# Patient Record
Sex: FEMALE | Race: WHITE | URBAN - METROPOLITAN AREA
[De-identification: names, ages, dates, MRNs, and addresses within clinical notes are randomized per-mention and may not be internally consistent; named-entity substitution may affect disease eponyms.]

---

## 2019-09-03 ENCOUNTER — NEW PATIENT (OUTPATIENT)
Dept: URBAN - METROPOLITAN AREA CLINIC 56 | Facility: CLINIC | Age: 54
End: 2019-09-03
Payer: COMMERCIAL

## 2019-09-03 DIAGNOSIS — H18.832 RECURRENT CORNEAL EROSION OF CORNEA, LEFT EYE: ICD-10-CM

## 2019-09-03 DIAGNOSIS — H52.13 MYOPIA, BILATERAL: ICD-10-CM

## 2019-09-03 DIAGNOSIS — H40.013 OPEN ANGLE WITH BORDERLINE FINDINGS, LOW RISK, BILATERAL: Primary | ICD-10-CM

## 2019-09-03 DIAGNOSIS — H04.123 TEAR FILM INSUFFICIENCY OF BILATERAL LACRIMAL GLANDS: ICD-10-CM

## 2019-09-03 DIAGNOSIS — H52.4 PRESBYOPIA: ICD-10-CM

## 2019-09-03 PROCEDURE — 92004 COMPRE OPH EXAM NEW PT 1/>: CPT | Performed by: OPTOMETRIST

## 2019-09-03 PROCEDURE — 76514 ECHO EXAM OF EYE THICKNESS: CPT | Performed by: OPTOMETRIST

## 2019-09-03 PROCEDURE — 92133 CPTRZD OPH DX IMG PST SGM ON: CPT | Performed by: OPTOMETRIST

## 2019-09-03 RX ORDER — NEOMYCIN SULFATE, POLYMYXIN B SULFATE AND DEXAMETHASONE 3.5; 10000; 1 MG/ML; [USP'U]/ML; MG/ML
SUSPENSION OPHTHALMIC
Qty: 1 | Refills: 2 | Status: INACTIVE
Start: 2019-09-03 | End: 2019-09-24

## 2019-09-03 ASSESSMENT — INTRAOCULAR PRESSURE
OS: 24
OD: 24

## 2019-09-03 ASSESSMENT — VISUAL ACUITY
OS: 20/20
OD: 20/20

## 2019-09-03 ASSESSMENT — KERATOMETRY
OD: 43.13
OS: 43.42

## 2019-09-24 ENCOUNTER — FOLLOW UP ESTABLISHED (OUTPATIENT)
Dept: URBAN - METROPOLITAN AREA CLINIC 56 | Facility: CLINIC | Age: 54
End: 2019-09-24
Payer: COMMERCIAL

## 2019-09-24 DIAGNOSIS — H40.053 OCULAR HYPERTENSION, BILATERAL: ICD-10-CM

## 2019-09-24 PROCEDURE — 92134 CPTRZ OPH DX IMG PST SGM RTA: CPT | Performed by: OPTOMETRIST

## 2019-09-24 PROCEDURE — 92012 INTRM OPH EXAM EST PATIENT: CPT | Performed by: OPTOMETRIST

## 2019-09-24 RX ORDER — CARBOXYMETHYLCELLULOSE SODIUM, GLYCERIN AND POLYSORBATE 80 5; 10; 5 MG/ML; MG/ML; MG/ML
SOLUTION/ DROPS OPHTHALMIC
Qty: 2 | Refills: 0 | Status: INACTIVE
Start: 2019-09-24 | End: 2019-09-25

## 2019-09-24 RX ORDER — NAPHAZOLINE HCL/GLYCERIN 0.012-0.2%
DROPS OPHTHALMIC (EYE)
Qty: 180 | Refills: 3 | Status: INACTIVE
Start: 2019-09-24 | End: 2019-09-25

## 2019-09-24 RX ORDER — CARBOXYMETHYLCELLULOSE SODIUM 10 MG/ML
1 % GEL OPHTHALMIC
Qty: 1 | Refills: 3 | Status: INACTIVE
Start: 2019-09-24 | End: 2019-09-25

## 2019-09-24 ASSESSMENT — INTRAOCULAR PRESSURE
OS: 24
OD: 24

## 2019-09-24 ASSESSMENT — VISUAL ACUITY
OD: 20/20
OS: 20/20

## 2019-10-08 ENCOUNTER — FOLLOW UP ESTABLISHED (OUTPATIENT)
Dept: URBAN - METROPOLITAN AREA CLINIC 56 | Facility: CLINIC | Age: 54
End: 2019-10-08
Payer: COMMERCIAL

## 2019-10-08 DIAGNOSIS — H52.03 HYPERMETROPIA, BILATERAL: ICD-10-CM

## 2019-10-08 PROCEDURE — 92012 INTRM OPH EXAM EST PATIENT: CPT | Performed by: OPTOMETRIST

## 2019-10-08 ASSESSMENT — VISUAL ACUITY
OD: 20/20
OS: 20/20

## 2019-10-08 ASSESSMENT — INTRAOCULAR PRESSURE
OD: 22
OS: 20

## 2019-12-26 ENCOUNTER — FOLLOW UP ESTABLISHED (OUTPATIENT)
Dept: URBAN - METROPOLITAN AREA CLINIC 56 | Facility: CLINIC | Age: 54
End: 2019-12-26
Payer: COMMERCIAL

## 2019-12-26 PROCEDURE — 92012 INTRM OPH EXAM EST PATIENT: CPT | Performed by: OPTOMETRIST

## 2019-12-26 RX ORDER — LOTEPREDNOL ETABONATE 5 MG/ML
0.5 % SUSPENSION/ DROPS OPHTHALMIC
Qty: 1 | Refills: 0 | Status: INACTIVE
Start: 2019-12-26 | End: 2020-07-24

## 2019-12-26 RX ORDER — CYCLOSPORINE 0.5 MG/ML
0.05 % EMULSION OPHTHALMIC
Qty: 2 | Refills: 1 | Status: INACTIVE
Start: 2019-12-26 | End: 2020-01-10

## 2019-12-26 ASSESSMENT — INTRAOCULAR PRESSURE
OD: 21
OS: 20

## 2020-03-10 ENCOUNTER — FOLLOW UP ESTABLISHED (OUTPATIENT)
Dept: URBAN - METROPOLITAN AREA CLINIC 56 | Facility: CLINIC | Age: 55
End: 2020-03-10
Payer: COMMERCIAL

## 2020-03-10 PROCEDURE — 92012 INTRM OPH EXAM EST PATIENT: CPT | Performed by: OPTOMETRIST

## 2020-03-10 RX ORDER — NEOMYCIN SULFATE, POLYMYXIN B SULFATE AND DEXAMETHASONE 3.5; 10000; 1 MG/ML; [USP'U]/ML; MG/ML
SUSPENSION OPHTHALMIC
Qty: 1 | Refills: 1 | Status: INACTIVE
Start: 2020-03-10 | End: 2020-07-24

## 2020-03-10 ASSESSMENT — INTRAOCULAR PRESSURE
OD: 20
OS: 20

## 2020-07-24 ENCOUNTER — FOLLOW UP ESTABLISHED (OUTPATIENT)
Dept: URBAN - METROPOLITAN AREA CLINIC 51 | Facility: CLINIC | Age: 55
End: 2020-07-24
Payer: COMMERCIAL

## 2020-07-24 PROCEDURE — 0330T TEAR FILM IMG UNI/BI W/I&R: CPT | Performed by: OPTOMETRIST

## 2020-11-24 ENCOUNTER — FOLLOW UP ESTABLISHED (OUTPATIENT)
Dept: URBAN - METROPOLITAN AREA CLINIC 56 | Facility: CLINIC | Age: 55
End: 2020-11-24
Payer: COMMERCIAL

## 2020-11-24 PROCEDURE — 68761 CLOSE TEAR DUCT OPENING: CPT | Performed by: OPTOMETRIST

## 2020-11-24 PROCEDURE — 92015 DETERMINE REFRACTIVE STATE: CPT | Performed by: OPTOMETRIST

## 2020-11-24 PROCEDURE — 92012 INTRM OPH EXAM EST PATIENT: CPT | Performed by: OPTOMETRIST

## 2020-11-24 ASSESSMENT — INTRAOCULAR PRESSURE
OD: 15
OS: 15

## 2020-11-24 ASSESSMENT — VISUAL ACUITY
OS: 20/20
OD: 20/20

## 2021-08-10 ENCOUNTER — OFFICE VISIT (OUTPATIENT)
Dept: URBAN - METROPOLITAN AREA CLINIC 51 | Facility: CLINIC | Age: 56
End: 2021-08-10
Payer: COMMERCIAL

## 2021-08-10 PROCEDURE — 99214 OFFICE O/P EST MOD 30 MIN: CPT | Performed by: OPTOMETRIST

## 2021-08-10 RX ORDER — PREDNISOLONE ACETATE 10 MG/ML
1 % SUSPENSION/ DROPS OPHTHALMIC
Qty: 15 | Refills: 1 | Status: INACTIVE
Start: 2021-08-10 | End: 2021-09-08

## 2021-08-10 ASSESSMENT — INTRAOCULAR PRESSURE
OS: 14
OD: 14

## 2021-08-10 NOTE — IMPRESSION/PLAN
Impression: Keratoconjunctivitis sicca, bilateral recalcitrant and has experienced little subjective improvement with persistent conjunctival and corneal staining after a regime of frequent ocular lubricants, lid hygiene, **Intolerable to Xiidra (D/C) due to dysgeusia and no improvement in symptoms and Restasis (1 year ago) caused burning OU. Patient stopped Restasis since [place]. *Uses Refresh Omega 3s, most preferred drops *has not done warm compresses, tried eye heat pads and it burned Plan: START Pred Forte QID OU to help reduce inflammation. Cont with Refresh Dolphin 3 FAs 1gtt Q1-q2h OU. Focus on dry warm compresses ~ 10 minutes. Pt should also get a thryoid panel:
 Pt needs following labs: Anti-thryoglobulin (TSI) Anti-thyroid peroixidase Anti-TSH receptor T3 and T4 levels TSH levels Pt will follow up regarding hormone panel. She has hot flashes, sweats easily. Has to urinate often nightly. Pt needs the following work up/labs to rule out any collagen vascular disorder/autoimmune disease: CBC Erythrocyte sedimentation rate (ESR) HLA-B27
ACE level, Antinuclear antibody (BLAYNE) Rapid plasma reagin (RPR) Venereal disease research laboratory ([facility]) FTA-ABS or MHA-TP Purified protein derivative (PPD) and anergy panel Chest radiograph to rule out sarcoidosis and tuberculosis Follow up in 1 month.

## 2021-09-27 ENCOUNTER — OFFICE VISIT (OUTPATIENT)
Dept: URBAN - METROPOLITAN AREA CLINIC 51 | Facility: CLINIC | Age: 56
End: 2021-09-27
Payer: COMMERCIAL

## 2021-09-27 DIAGNOSIS — H16.223 KERATOCONJUNCTIVITIS SICCA, NOT SPECIFIED AS SJOGREN'S, BILATERAL: Primary | ICD-10-CM

## 2021-09-27 PROCEDURE — 99212 OFFICE O/P EST SF 10 MIN: CPT | Performed by: OPTOMETRIST

## 2021-09-27 RX ORDER — AZITHROMYCIN MONOHYDRATE 250 MG/1
250 MG TABLET, FILM COATED ORAL
Qty: 5 | Refills: 1 | Status: INACTIVE
Start: 2021-09-27 | End: 2021-10-01

## 2021-09-27 RX ORDER — DOXYCYCLINE HYCLATE 100 MG/1
100 MG CAPSULE, GELATIN COATED ORAL
Qty: 60 | Refills: 1 | Status: INACTIVE
Start: 2021-09-27 | End: 2021-09-27

## 2021-09-27 RX ORDER — CYCLOSPORINE 0.5 MG/ML
0.05 % EMULSION OPHTHALMIC
Qty: 120 | Refills: 6 | Status: INACTIVE
Start: 2021-09-27 | End: 2021-10-26

## 2021-09-27 NOTE — IMPRESSION/PLAN
Impression: Keratoconjunctivitis sicca, bilateral with specific area of PEK OU **Intolerable to Xiidra (D/C) due to dysgeusia and no improvement in symptoms and Restasis (1 year ago) caused burning OU, but is willing to retry. *Uses a electronic mask that only heats around her eyes and not on lids *She has hot flashes, sweats easily- low estrogen and low progesterone *Recently had blood work done- no autoimmune marker, low TSH *multiple gtts used, Castor oil periorbitally, denies allergies but has runny nose and congestion Plan: Reviewed complexity of Ocular Surface Disease (reviewed with patient diagnosis which is a multifactorial condition) For now, address possible allergic reactions, had pt demo gtt instillation- poor technique, touches conj OU Cont with Refresh [employer] 3 FAs 1gtt Q1-q2h OU. Demonstrate and reviewed with patient on better gtt instillation and to keep eyes closed for 20-30 seconds. DO NOT TOUCH eye! START zpak as directed and take OTC medication such as Zyrtec for allergies. Patient has been dealing with runny nose while I am eating. RESTART Restasis BID OU. Sent to local pharmacy. Patient is scheduled for appointment with gynecologist to get low hormones addressed. RTC in 1 week for f/u and reassess how she is doing. Main point is to address hormones.